# Patient Record
Sex: FEMALE | Race: WHITE | Employment: FULL TIME | URBAN - METROPOLITAN AREA
[De-identification: names, ages, dates, MRNs, and addresses within clinical notes are randomized per-mention and may not be internally consistent; named-entity substitution may affect disease eponyms.]

---

## 2018-05-01 ENCOUNTER — OFFICE VISIT (OUTPATIENT)
Dept: FAMILY MEDICINE CLINIC | Age: 33
End: 2018-05-01

## 2018-05-01 VITALS — WEIGHT: 292 LBS | BODY MASS INDEX: 51.74 KG/M2 | HEIGHT: 63 IN

## 2019-07-02 ENCOUNTER — HOSPITAL ENCOUNTER (EMERGENCY)
Age: 34
Discharge: HOME | End: 2019-07-02
Attending: EMERGENCY MEDICINE | Admitting: EMERGENCY MEDICINE
Payer: SELF-PAY

## 2019-07-02 VITALS
WEIGHT: 250 LBS | BODY MASS INDEX: 44.3 KG/M2 | OXYGEN SATURATION: 100 % | SYSTOLIC BLOOD PRESSURE: 135 MMHG | HEART RATE: 103 BPM | TEMPERATURE: 98.3 F | DIASTOLIC BLOOD PRESSURE: 90 MMHG | HEIGHT: 63 IN | RESPIRATION RATE: 20 BRPM

## 2019-07-02 DIAGNOSIS — L72.3 INFECTED SEBACEOUS CYST OF SKIN: Primary | ICD-10-CM

## 2019-07-02 DIAGNOSIS — L08.9 INFECTED SEBACEOUS CYST OF SKIN: Primary | ICD-10-CM

## 2019-07-02 PROCEDURE — 99282 EMERGENCY DEPT VISIT SF MDM: CPT

## 2019-07-02 RX ORDER — VENLAFAXINE HYDROCHLORIDE 150 MG/1
150 TABLET, EXTENDED RELEASE ORAL
COMMUNITY
Start: 2014-11-06 | End: 2021-06-11 | Stop reason: SDUPTHER

## 2019-07-02 RX ORDER — SULFAMETHOXAZOLE AND TRIMETHOPRIM 800; 160 MG/1; MG/1
2 TABLET ORAL 2 TIMES DAILY
Qty: 40 TAB | Refills: 0 | Status: SHIPPED | OUTPATIENT
Start: 2019-07-02 | End: 2019-07-12

## 2019-07-02 NOTE — LETTER
[facility] [place] 
[facility] EMERGENCY DEPT 
[place] [id number] [phone] Work/School Note Date: 7/2/2019 To Whom It May concern: 
 
Mary Session was seen and treated today in the emergency room by the following provider(s): 
Attending Provider: Jose Wong MD 
Physician Assistant: Nestor Sanabria PA-C. Mary Session may return to work on 07/03/2019. Sincerely, Radha Menendez PA-C

## 2019-09-25 ENCOUNTER — HOSPITAL ENCOUNTER (EMERGENCY)
Age: 34
Discharge: HOME | End: 2019-09-25
Attending: EMERGENCY MEDICINE
Payer: SELF-PAY

## 2019-09-25 VITALS
WEIGHT: 260 LBS | DIASTOLIC BLOOD PRESSURE: 90 MMHG | HEART RATE: 78 BPM | SYSTOLIC BLOOD PRESSURE: 152 MMHG | OXYGEN SATURATION: 100 % | RESPIRATION RATE: 15 BRPM | TEMPERATURE: 98 F | BODY MASS INDEX: 46.07 KG/M2 | HEIGHT: 63 IN

## 2019-09-25 DIAGNOSIS — L98.9 SKIN LESION OF RIGHT LEG: Primary | ICD-10-CM

## 2019-09-25 PROCEDURE — 99282 EMERGENCY DEPT VISIT SF MDM: CPT

## 2019-09-25 NOTE — DISCHARGE INSTRUCTIONS
Contact dermatology or general surgery to discuss having skin lesion excised     General Discharge: Care Instructions  Your Care Instructions    One or more doctors have given you a physical exam, reviewed your symptoms, and asked about your past medical problems. You have had tests as needed. At this time, the doctors feel it is safe for you to go home. It is very important that you follow up with your doctor as directed. If you continue to have symptoms, you may need more tests or treatment. The doctor has checked you carefully, but problems can develop later. If you notice any problems or new symptoms,  get medical treatment right away. Follow-up care is a key part of your treatment and safety. Be sure to make and go to all appointments, and call your doctor if you are having problems. It's also a good idea to know your test results and keep a list of the medicines you take. How can you care for yourself at home? · Keep track of any new symptoms or changes in your symptoms. · Rest until you feel better. · Be safe with medicines. Take your medicines exactly as prescribed. Call your doctor if you think you are having a problem with your medicine. · Do not drive after taking a prescription pain medicine. When should you call for help? Call 911 anytime you think you may need emergency care. For example, call if:    · You passed out (lost consciousness).    Call your doctor now or seek immediate medical care if:    · You have new symptoms like fever, difficulty breathing, vomiting, or rash.     · You have new or different pain.     · You are confused and are having trouble thinking clearly.     · Your symptoms are getting worse.    Watch closely for changes in your health, and be sure to contact your doctor if:    · You do not get better as expected. Where can you learn more? Go to http://ayana-liv.info/.   Enter G150 in the search box to learn more about \"General Discharge: Care Instructions. \"  Current as of: June 26, 2019  Content Version: 12.2  © 4922-4332 [employer]. Care instructions adapted under license by [employer] (which disclaims liability or warranty for this information). If you have questions about a medical condition or this instruction, always ask your healthcare professional. [place] [address] any warranty or liability for your use of this information.

## 2019-09-25 NOTE — ED PROVIDER NOTES
EMERGENCY DEPARTMENT HISTORY AND PHYSICAL EXAM    Date: 9/25/2019  Patient Name: Lam Camargo    History of Presenting Illness     Time Seen:6:57 PM    Chief Complaint   Patient presents with    Wound Check       History Provided By: Patient    Additional History (Context):   Lam Camargo is a 26 y.o. female presents to the emergency room with complaints of right inner thigh wound that has been there since June. Complains of irritation to the right inner thigh. Also is concerned because the outer edges of the wound are black. Was seen here July 2, 2019 and diagnosed with an infected sebaceous cyst.  Was in there and states it has not healed up. Denies any bleeding or drainage. PCP: None    Current Outpatient Medications   Medication Sig Dispense Refill    Venlafaxine 150 mg tr24 Take 150 mg by mouth. Past History     Past Medical History:  History reviewed. No pertinent past medical history. Past Surgical History:  History reviewed. No pertinent surgical history. Family History:  History reviewed. No pertinent family history. Social History:  Social History     Tobacco Use    Smoking status: Never Smoker    Smokeless tobacco: Never Used   Substance Use Topics    Alcohol use: Not Currently    Drug use: Not on file       Allergies:  No Known Allergies      Review of Systems   Review of Systems   Constitutional: Negative for chills and fever. Skin: Positive for wound. Skin lesion right inner thigh   All other systems reviewed and are negative. Physical Exam     Vitals:    09/25/19 1854   BP: 152/90   Pulse: 78   Resp: 15   Temp: 98 °F (36.7 °C)   SpO2: 100%   Weight: 117.9 kg (260 lb)   Height: 5' 3\" (1.6 m)     Physical Exam   Constitutional: She is oriented to person, place, and time. She appears well-developed and well-nourished. No distress. Cardiovascular: Normal rate, regular rhythm and normal heart sounds.    Pulmonary/Chest: Effort normal and breath sounds normal.   Neurological: She is alert and oriented to person, place, and time. Skin: Skin is warm and dry. Raised oval lesion on the medial aspect of the right thigh (mid). Measures approximately 3 cm x 1-1/2 cm. Surface of the lesion is split. There is no active bleeding or drainage. No redness or warmth. No surrounding induration. Minimally tender. There is no evidence of skin necrosis. Inner portion of the lesion does not show any evidence of cystic appearance. Appears more like it hyperproliferative tissue. Psychiatric: She has a normal mood and affect. Nursing note and vitals reviewed. Nursing note and vitals reviewed         Diagnostic Study Results     Labs -   No results found for this or any previous visit (from the past 12 hour(s)). Radiologic Studies   No orders to display     CT Results  (Last 48 hours)    None        CXR Results  (Last 48 hours)    None            Medical Decision Making   I am the first provider for this patient. I reviewed the vital signs, available nursing notes, past medical history, past surgical history, family history and social history. Vital Signs-Reviewed the patient's vital signs. Records Reviewed: Nursing Notes    DDX: Right thigh lesion etiology unclear    Provider Notes:   26 y.o. female presents emergency room with complaints of right inner thigh lesion that has been there is since June. States is not improving. Concern for infection. Here, it does not look like a sebaceous cyst.  It looks more along the lines of a granuloma possibly. Hard to say. Firmly believe that the lesion needs to be evaluated by dermatology, excised and sent off for pathology. Patient was informed of this thought process. We will give her a referral to see either dermatology or general surgery. Advised her to keep wound clean and keep bandage so will not irritate her. Discharge home.     Procedures:  Procedures    ED Course:   Initial assessment performed. The patients presenting problems have been discussed, and they are in agreement with the care plan formulated and outlined with them. I have encouraged them to ask questions as they arise throughout their visit. Diagnosis and Disposition       DISCHARGE NOTE:  7:17 PM    Vicenta Berkowitz's  results have been reviewed with her. She has been counseled regarding her diagnosis, treatment, and plan. She verbally conveys understanding and agreement of the signs, symptoms, diagnosis, treatment and prognosis and additionally agrees to follow up as discussed. She also agrees with the care-plan and conveys that all of her questions have been answered. I have also provided discharge instructions for her that include: educational information regarding their diagnosis and treatment, and list of reasons why they would want to return to the ED prior to their follow-up appointment, should her condition change. She has been provided with education for proper emergency department utilization. CLINICAL IMPRESSION:    1. Skin lesion of right leg        PLAN:  1. D/C Home  2. Discharge Medication List as of 9/25/2019  7:24 PM        3. Follow-up Information     Follow up With Specialties Details Why [facility]    [facility]  Call Tomorrow to set up an appointment to be seen [address]  [phone]    PCP   May call general surgery to have excised as well, As needed         ____________________________________     Please note that this dictation was completed with [employer], the computer voice recognition software. Quite often unanticipated grammatical, syntax, homophones, and other interpretive errors are inadvertently transcribed by the computer software. Please disregard these errors. Please excuse any errors that have escaped final proofreading.

## 2019-09-25 NOTE — ED TRIAGE NOTES
Pt reports wound to inner R thigh that she had treated in 06/2019. Pt denies the wound is getting better.

## 2019-09-25 NOTE — ED NOTES
Verbal shift change report given to Sarah Squires (oncoming nurse) by Barb Vo (offgoing nurse). Report included the following information SBAR, ED Summary, MAR and Recent Results.

## 2020-03-08 NOTE — ED PROVIDER NOTES
EMERGENCY DEPARTMENT HISTORY AND PHYSICAL EXAM    Date: 7/2/2019  Patient Name: Pema Carl    History of Presenting Illness     Chief Complaint   Patient presents with    Abscess         History Provided By: Patient    Chief Complaint: abscess    HPI(Context):   3:51 PM  Pema Carl is a 26 y.o. female who presents to the emergency department C/O right thigh abscess. Reports pain and swelling at site. Sxs x several days. No drainage. Pt attempted to drain wound with no relief. Pt denies DMII. She shaves area where abscess formed. Pt denies fever, chills, nausea, vomiting, and any other sxs or complaints. PCP: None    Current Outpatient Medications   Medication Sig Dispense Refill    Venlafaxine 150 mg tr24 Take 150 mg by mouth.  trimethoprim-sulfamethoxazole (BACTRIM DS) 160-800 mg per tablet Take 2 Tabs by mouth two (2) times a day for 10 days. Indications: skin infection 40 Tab 0       Past History     Past Medical History:  History reviewed. No pertinent past medical history. Past Surgical History:  History reviewed. No pertinent surgical history. Family History:  History reviewed. No pertinent family history. Social History:  Social History     Tobacco Use    Smoking status: Never Smoker    Smokeless tobacco: Never Used   Substance Use Topics    Alcohol use: Not Currently    Drug use: Not on file       Allergies:  No Known Allergies      Review of Systems   Review of Systems   Constitutional: Negative for chills and fever. Gastrointestinal: Negative for nausea and vomiting. Skin:        Abscess to R thigh     All other systems reviewed and are negative. Physical Exam     Vitals:    07/02/19 1238   BP: 135/90   Pulse: (!) 103   Resp: 20   Temp: 98.3 °F (36.8 °C)   SpO2: 100%   Weight: 113.4 kg (250 lb)   Height: 5' 3\" (1.6 m)     Physical Exam   Constitutional: She is oriented to person, place, and time. She appears well-developed and well-nourished. No distress.    Obese  female in NAD. Alert. Appears comfortable. HENT:   Head: Normocephalic and atraumatic. Right Ear: External ear normal.   Left Ear: External ear normal.   Eyes: Conjunctivae are normal.   Neck: Normal range of motion. Cardiovascular: Normal rate, regular rhythm, normal heart sounds and intact distal pulses. Pulmonary/Chest: Breath sounds normal.   Musculoskeletal: Normal range of motion. Neurological: She is alert and oriented to person, place, and time. Skin: Skin is warm. She is not diaphoretic. Psychiatric: She has a normal mood and affect. Judgment normal.   Nursing note and vitals reviewed. Diagnostic Study Results     Labs -   No results found for this or any previous visit (from the past 12 hour(s)). No orders to display     CT Results  (Last 48 hours)    None        CXR Results  (Last 48 hours)    None          Medications given in the ED-  Medications - No data to display      Medical Decision Making   I am the first provider for this patient. I reviewed the vital signs, available nursing notes, past medical history, past surgical history, family history and social history. Vital Signs-Reviewed the patient's vital signs. Pulse Oximetry Analysis - 100% on RA     Records Reviewed: Nursing Notes    Provider Notes (Medical Decision Making): cellulitis, abscess, infected sebaceous cyst, inclusion cyst, folliculitis    Procedures:  Procedures    ED Course:   3:51 PM Initial assessment performed. The patients presenting problems have been discussed, and they are in agreement with the care plan formulated and outlined with them. I have encouraged them to ask questions as they arise throughout their visit. Diagnosis and Disposition       Nothing to I&D. ABX. SITZ baths. No evidence of deep or systemic process. Pt is immunocompetent. No hx of DMII. Reasons to RTED discussed with pt. All questions answered. Pt feels comfortable going home at this time.  Pt expressed understanding and she agrees with plan. 1. Infected sebaceous cyst of skin        PLAN:  1. D/C Home  2. Discharge Medication List as of 7/2/2019  2:14 PM      START taking these medications    Details   trimethoprim-sulfamethoxazole (BACTRIM DS) 160-800 mg per tablet Take 2 Tabs by mouth two (2) times a day for 10 days. Indications: skin infection, Print, Disp-40 Tab, R-0         CONTINUE these medications which have NOT CHANGED    Details   Venlafaxine 150 mg tr24 Take 150 mg by mouth., Historical Med           3. Follow-up Information     Follow up With Specialties Details Why Contact Info    [facility]    [id number] [facility], [address] [place] [address] [address] [facility] [address]    [facility] EMERGENCY DEPT Emergency Medicine  As needed, If symptoms worsen [address]  [address] [address]  [phone]        _______________________________    Attestations: This note is prepared by Fanny Posey PA-C.  _______________________________          Please note that this dictation was completed with [employer], the computer voice recognition software. Quite often unanticipated grammatical, syntax, homophones, and other interpretive errors are inadvertently transcribed by the computer software. Please disregard these errors. Please excuse any errors that have escaped final proofreading. rolling walker

## 2021-06-11 PROBLEM — F10.939 ALCOHOL WITHDRAWAL (HCC): Status: ACTIVE | Noted: 2021-06-11

## 2021-06-11 PROBLEM — F10.10 ETOH ABUSE: Status: ACTIVE | Noted: 2021-06-11

## 2021-06-11 PROBLEM — E83.42 HYPOMAGNESEMIA: Status: ACTIVE | Noted: 2021-06-11

## 2021-06-11 PROBLEM — E87.6 HYPOKALEMIA: Status: ACTIVE | Noted: 2021-06-11

## 2022-03-18 PROBLEM — E87.6 HYPOKALEMIA: Status: ACTIVE | Noted: 2021-06-11

## 2022-03-19 PROBLEM — F10.10 ETOH ABUSE: Status: ACTIVE | Noted: 2021-06-11

## 2022-03-19 PROBLEM — F10.939 ALCOHOL WITHDRAWAL (HCC): Status: ACTIVE | Noted: 2021-06-11

## 2022-03-20 PROBLEM — E83.42 HYPOMAGNESEMIA: Status: ACTIVE | Noted: 2021-06-11

## 2023-01-31 RX ORDER — PROPRANOLOL HYDROCHLORIDE 10 MG/1
10 TABLET ORAL EVERY 8 HOURS
COMMUNITY
Start: 2021-06-16

## 2023-01-31 RX ORDER — BUPROPION HYDROCHLORIDE 150 MG/1
150 TABLET ORAL
COMMUNITY
Start: 2021-05-19

## 2023-01-31 RX ORDER — FOLIC ACID 1 MG/1
1 TABLET ORAL DAILY
COMMUNITY
Start: 2021-06-16

## 2023-01-31 RX ORDER — CHLORDIAZEPOXIDE HYDROCHLORIDE 10 MG/1
10 CAPSULE, GELATIN COATED ORAL 3 TIMES DAILY
COMMUNITY
Start: 2021-06-15

## 2023-01-31 RX ORDER — ACAMPROSATE CALCIUM 333 MG/1
333 TABLET, DELAYED RELEASE ORAL 3 TIMES DAILY
COMMUNITY
Start: 2021-06-02

## 2023-01-31 RX ORDER — VENLAFAXINE HYDROCHLORIDE 150 MG/1
150 CAPSULE, EXTENDED RELEASE ORAL DAILY
COMMUNITY
Start: 2021-05-04

## 2023-01-31 RX ORDER — ONDANSETRON 4 MG/1
4 TABLET, ORALLY DISINTEGRATING ORAL EVERY 8 HOURS PRN
COMMUNITY
Start: 2021-06-15